# Patient Record
Sex: FEMALE | Race: BLACK OR AFRICAN AMERICAN | NOT HISPANIC OR LATINO | URBAN - METROPOLITAN AREA
[De-identification: names, ages, dates, MRNs, and addresses within clinical notes are randomized per-mention and may not be internally consistent; named-entity substitution may affect disease eponyms.]

---

## 2018-01-25 ENCOUNTER — EMERGENCY (EMERGENCY)
Facility: HOSPITAL | Age: 23
LOS: 1 days | Discharge: LEFT BEFORE DISCHARGE | End: 2018-01-25
Attending: EMERGENCY MEDICINE | Admitting: EMERGENCY MEDICINE
Payer: COMMERCIAL

## 2018-01-25 VITALS
OXYGEN SATURATION: 99 % | HEART RATE: 93 BPM | HEIGHT: 65 IN | WEIGHT: 214.95 LBS | RESPIRATION RATE: 18 BRPM | DIASTOLIC BLOOD PRESSURE: 76 MMHG | SYSTOLIC BLOOD PRESSURE: 119 MMHG | TEMPERATURE: 100 F

## 2018-01-25 PROCEDURE — 99282 EMERGENCY DEPT VISIT SF MDM: CPT

## 2018-01-25 PROCEDURE — 99283 EMERGENCY DEPT VISIT LOW MDM: CPT

## 2018-01-25 RX ORDER — ACETAMINOPHEN 500 MG
650 TABLET ORAL ONCE
Qty: 0 | Refills: 0 | Status: COMPLETED | OUTPATIENT
Start: 2018-01-25 | End: 2018-01-25

## 2018-01-25 RX ADMIN — Medication 650 MILLIGRAM(S): at 20:30

## 2018-01-25 NOTE — ED ADULT NURSE NOTE - OBJECTIVE STATEMENT
pt presents with cold like symtoms since Monday, + generalized aches and pain. + cough - yellow green sputum+ low grade fever reported. denies flu shot . + sick child with fever

## 2018-01-25 NOTE — ED ADULT TRIAGE NOTE - CHIEF COMPLAINT QUOTE
pt c/o cough with congestion, aches and feelilng weak since monday, reports she is starting to feel a little better ,but now daughter is getting sick

## 2018-01-25 NOTE — ED PROVIDER NOTE - OBJECTIVE STATEMENT
23 yo F presented to ED with c/o fevers x 3 days ago with runny nose, cough, and HA. No Cp or SOB. No abd pain. No neck pain or photophobia. No recent travel. 5 yo daughter also sick with same. no flu vaccine this year.

## 2018-01-25 NOTE — ED PROVIDER NOTE - ATTENDING CONTRIBUTION TO CARE
seen with acp  c/o fever congestion amalise with nausea   throat no exudates neck supple tms ok  chest clear  Agree with PAs assessment hx and physical

## 2018-02-25 ENCOUNTER — EMERGENCY (EMERGENCY)
Facility: HOSPITAL | Age: 23
LOS: 1 days | Discharge: HOME | End: 2018-02-25
Attending: EMERGENCY MEDICINE | Admitting: EMERGENCY MEDICINE
Payer: COMMERCIAL

## 2018-02-25 VITALS
WEIGHT: 229.94 LBS | SYSTOLIC BLOOD PRESSURE: 119 MMHG | HEIGHT: 63 IN | DIASTOLIC BLOOD PRESSURE: 77 MMHG | TEMPERATURE: 99 F | HEART RATE: 77 BPM | RESPIRATION RATE: 18 BRPM | OXYGEN SATURATION: 100 %
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
## 2018-02-25 DIAGNOSIS — R51 HEADACHE: ICD-10-CM
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
## 2018-02-25 PROCEDURE — 99283 EMERGENCY DEPT VISIT LOW MDM: CPT

## 2018-02-25 RX ORDER — IBUPROFEN 200 MG
600 TABLET ORAL ONCE
Qty: 0 | Refills: 0 | Status: COMPLETED | OUTPATIENT
Start: 2018-02-25 | End: 2018-02-25

## 2018-02-25 RX ADMIN — Medication 600 MILLIGRAM(S): at 11:50
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
## 2018-03-20 PROBLEM — Z00.00 ENCOUNTER FOR PREVENTIVE HEALTH EXAMINATION: Status: ACTIVE | Noted: 2018-03-20

## 2018-03-27 ENCOUNTER — APPOINTMENT (OUTPATIENT)
Dept: OBGYN | Facility: CLINIC | Age: 23
End: 2018-03-27

## 2018-04-07 ENCOUNTER — EMERGENCY (EMERGENCY)
Facility: HOSPITAL | Age: 23
LOS: 1 days | Discharge: HOME | End: 2018-04-07
Attending: EMERGENCY MEDICINE | Admitting: EMERGENCY MEDICINE
Payer: MEDICAID

## 2018-04-07 VITALS
DIASTOLIC BLOOD PRESSURE: 77 MMHG | OXYGEN SATURATION: 99 % | SYSTOLIC BLOOD PRESSURE: 124 MMHG | TEMPERATURE: 99 F | HEART RATE: 80 BPM

## 2018-04-07 VITALS — HEIGHT: 65 IN | WEIGHT: 220.02 LBS

## 2018-04-07 LAB
APPEARANCE UR: CLEAR — SIGNIFICANT CHANGE UP
BILIRUB UR-MCNC: NEGATIVE — SIGNIFICANT CHANGE UP
COLOR SPEC: YELLOW — SIGNIFICANT CHANGE UP
DIFF PNL FLD: NEGATIVE — SIGNIFICANT CHANGE UP
GLUCOSE UR QL: NEGATIVE MG/DL — SIGNIFICANT CHANGE UP
HCG UR QL: NEGATIVE — SIGNIFICANT CHANGE UP
HIV 1 & 2 AB SERPL IA.RAPID: SIGNIFICANT CHANGE UP
KETONES UR-MCNC: NEGATIVE — SIGNIFICANT CHANGE UP
LEUKOCYTE ESTERASE UR-ACNC: NEGATIVE — SIGNIFICANT CHANGE UP
NITRITE UR-MCNC: NEGATIVE — SIGNIFICANT CHANGE UP
PH UR: 6 — SIGNIFICANT CHANGE UP (ref 5–8)
PROT UR-MCNC: NEGATIVE MG/DL — SIGNIFICANT CHANGE UP
SP GR SPEC: 1.01 — SIGNIFICANT CHANGE UP (ref 1.01–1.02)
UROBILINOGEN FLD QL: NEGATIVE MG/DL — SIGNIFICANT CHANGE UP

## 2018-04-07 PROCEDURE — 99284 EMERGENCY DEPT VISIT MOD MDM: CPT

## 2018-04-07 PROCEDURE — 86703 HIV-1/HIV-2 1 RESULT ANTBDY: CPT

## 2018-04-07 PROCEDURE — 81003 URINALYSIS AUTO W/O SCOPE: CPT

## 2018-04-07 PROCEDURE — 81025 URINE PREGNANCY TEST: CPT

## 2018-04-07 PROCEDURE — 36415 COLL VENOUS BLD VENIPUNCTURE: CPT

## 2018-04-07 RX ORDER — METRONIDAZOLE 500 MG
1 TABLET ORAL
Qty: 14 | Refills: 0 | OUTPATIENT
Start: 2018-04-07 | End: 2018-04-13

## 2018-04-07 NOTE — ED PROVIDER NOTE - OBJECTIVE STATEMENT
22 year old female, presenting to ER for evaluation of vaginal discharge after having unprotected sex 1 week ago with a new partner without STI status known. Pt is new to the area and has not established care here in [place]. Pt denies hx of std's. Pt states that she has been having a greenish/cloudy discharge that has a fishy odor for the last week. LMP was mid march (estimated march 19th). pt denies dysuria, increased frequency, hematuria, abdominal pain, flank pain, Feer, chills, diarrhea

## 2018-04-07 NOTE — ED ADULT NURSE NOTE - OBJECTIVE STATEMENT
patient states that she had unprotected sex last week and noticed " a few days after" vaginal discharge patient denies any pain or burning with urination patient denies any nausea/vomiting or diarrhea, patient states that she is concerned about an std

## 2018-04-07 NOTE — ED PROVIDER NOTE - MEDICAL DECISION MAKING DETAILS
22 year old female with vaginal discharge x 1 week  Pelvic Exam and swab 22 year old female with vaginal discharge x 1 week  Pelvic Exam  G/C, BV, Trich, HIV testing 22 year old female with vaginal discharge x 1 week  Pelvic Exam,   Treating empirically for BV  patient would prefer to be called about results of pending labs   G/C, BV, Trich, HIV testing

## 2018-04-07 NOTE — ED PROVIDER NOTE - PHYSICAL EXAMINATION
PELVIC: normal external genitalia, no lesions or discharge visual on external inspection. Speculum inserted, + grey/white malodorous  fishy smelling discharge in vaginal vault, cervix visualized and closed with white/cloudy discharge noted. Bimanual exam:  No CMT

## 2018-04-07 NOTE — ED PROVIDER NOTE - CARE PLAN
Assessment and plan of treatment:	22 year old with white/cloudy discharge and odor   GC/C, HIV testing, UA, BV  treating empirically for BV based on clinical presentation and physical Principal Discharge DX:	Bacterial vaginosis  Assessment and plan of treatment:	22 year old with white/cloudy discharge and odor   GC/C, HIV testing, UA, BV  treating empirically for BV based on clinical presentation and physical

## 2018-04-07 NOTE — ED PROVIDER NOTE - ATTENDING CONTRIBUTION TO CARE
seen withi acp  c/o greenish discharge  no pain fish odor  will obtain cultures hiv test  Imp bacterial vaginosis   will treat with flagyl  Agree with acps assessment hx and physical

## 2018-04-07 NOTE — ED PROVIDER NOTE - PLAN OF CARE
22 year old with white/cloudy discharge and odor   GC/C, HIV testing, UA, BV  treating empirically for BV based on clinical presentation and physical

## 2019-05-01 ENCOUNTER — EMERGENCY (EMERGENCY)
Facility: HOSPITAL | Age: 24
LOS: 1 days | Discharge: LEFT BEFORE DISCHARGE | End: 2019-05-01

## 2019-05-01 VITALS
RESPIRATION RATE: 18 BRPM | SYSTOLIC BLOOD PRESSURE: 127 MMHG | HEART RATE: 88 BPM | TEMPERATURE: 98 F | DIASTOLIC BLOOD PRESSURE: 72 MMHG | WEIGHT: 203.05 LBS | HEIGHT: 62 IN | OXYGEN SATURATION: 99 %
